# Patient Record
Sex: FEMALE | Race: WHITE | URBAN - METROPOLITAN AREA
[De-identification: names, ages, dates, MRNs, and addresses within clinical notes are randomized per-mention and may not be internally consistent; named-entity substitution may affect disease eponyms.]

---

## 2020-07-17 ENCOUNTER — OFFICE VISIT (OUTPATIENT)
Dept: URBAN - METROPOLITAN AREA CLINIC 98 | Facility: CLINIC | Age: 70
End: 2020-07-17

## 2020-08-06 ENCOUNTER — OFFICE VISIT (OUTPATIENT)
Dept: URBAN - METROPOLITAN AREA CLINIC 98 | Facility: CLINIC | Age: 70
End: 2020-08-06
Payer: MEDICARE

## 2020-08-06 DIAGNOSIS — K59.03 CONSTIPATION DUE TO OPIOID THERAPY: ICD-10-CM

## 2020-08-06 DIAGNOSIS — K75.81 NONALCOHOLIC STEATOHEPATITIS (NASH): ICD-10-CM

## 2020-08-06 DIAGNOSIS — K74.69 OTHER CIRRHOSIS OF LIVER: ICD-10-CM

## 2020-08-06 PROCEDURE — 3017F COLORECTAL CA SCREEN DOC REV: CPT | Performed by: INTERNAL MEDICINE

## 2020-08-06 PROCEDURE — G8417 CALC BMI ABV UP PARAM F/U: HCPCS | Performed by: INTERNAL MEDICINE

## 2020-08-06 PROCEDURE — G9903 PT SCRN TBCO ID AS NON USER: HCPCS | Performed by: INTERNAL MEDICINE

## 2020-08-06 PROCEDURE — 99214 OFFICE O/P EST MOD 30 MIN: CPT | Performed by: INTERNAL MEDICINE

## 2020-08-06 PROCEDURE — G8427 DOCREV CUR MEDS BY ELIG CLIN: HCPCS | Performed by: INTERNAL MEDICINE

## 2020-08-06 RX ORDER — LISDEXAMFETAMINE DIMESYLATE 50 MG/1
TAKE 1 CAPSULE (50 MG) BY ORAL ROUTE ONCE DAILY IN THE MORNING CAPSULE ORAL 1
Qty: 0 | Refills: 0 | Status: ACTIVE | COMMUNITY
Start: 1900-01-01

## 2020-08-06 RX ORDER — LISINOPRIL 5 MG/1
TABLET ORAL
Qty: 0 | Refills: 0 | Status: ACTIVE | COMMUNITY
Start: 1900-01-01

## 2020-08-06 RX ORDER — BUPROPION HYDROCHLORIDE 300 MG/1
TAKE 1 TABLET (300 MG) BY ORAL ROUTE ONCE DAILY TABLET, EXTENDED RELEASE ORAL 1
Qty: 0 | Refills: 0 | Status: ACTIVE | COMMUNITY
Start: 1900-01-01

## 2020-08-06 RX ORDER — ARIPIPRAZOLE 5 MG/1
TABLET ORAL
Qty: 0 | Refills: 0 | Status: ACTIVE | COMMUNITY
Start: 1900-01-01

## 2020-08-06 RX ORDER — DULOXETINE 60 MG/1
TAKE 1 CAPSULE (60 MG) BY ORAL ROUTE ONCE DAILY CAPSULE, DELAYED RELEASE PELLETS ORAL 1
Qty: 0 | Refills: 0 | Status: ACTIVE | COMMUNITY
Start: 1900-01-01

## 2020-08-06 RX ORDER — OXYCODONE 18 MG/1
TAKE 1 CAPSULE (18 MG) BY ORAL ROUTE EVERY 12 HOURS CAPSULE, EXTENDED RELEASE ORAL 2
Qty: 0 | Refills: 0 | Status: ACTIVE | COMMUNITY
Start: 1900-01-01

## 2020-08-06 NOTE — HPI-TODAY'S VISIT:
Has lost weight -did lose 40lbs  did stall now  still has Velveeta biscuits now and then  having problem with gas taking Movantik q3 days - BM q3days

## 2020-08-07 LAB
A/G RATIO: 1.6
AFP, SERUM, TUMOR MARKER: 1.6
ALBUMIN: 4
ALKALINE PHOSPHATASE: 85
ALT (SGPT): 20
AST (SGOT): 19
BASO (ABSOLUTE): 0.1
BASOS: 1
BILIRUBIN, TOTAL: 0.4
BUN/CREATININE RATIO: 21
BUN: 14
CALCIUM: 9.6
CARBON DIOXIDE, TOTAL: 26
CHLORIDE: 99
CREATININE: 0.67
EGFR IF AFRICN AM: 104
EGFR IF NONAFRICN AM: 91
EOS (ABSOLUTE): 0.2
EOS: 3
GLOBULIN, TOTAL: 2.5
GLUCOSE: 97
HEMATOCRIT: 36.2
HEMATOLOGY COMMENTS:: (no result)
HEMOGLOBIN: 12.1
IMMATURE CELLS: (no result)
IMMATURE GRANS (ABS): 0
IMMATURE GRANULOCYTES: 0
INR: 1
LYMPHS (ABSOLUTE): 1.9
LYMPHS: 29
MCH: 31.6
MCHC: 33.4
MCV: 95
MONOCYTES(ABSOLUTE): 0.4
MONOCYTES: 6
NEUTROPHILS (ABSOLUTE): 3.9
NEUTROPHILS: 61
NRBC: (no result)
PLATELETS: 150
POTASSIUM: 4.4
PROTEIN, TOTAL: 6.5
PROTHROMBIN TIME: 10.9
RBC: 3.83
RDW: 12.2
SODIUM: 141
WBC: 6.4

## 2020-08-10 ENCOUNTER — TELEPHONE ENCOUNTER (OUTPATIENT)
Dept: URBAN - METROPOLITAN AREA SURGERY CENTER 18 | Facility: SURGERY CENTER | Age: 70
End: 2020-08-10

## 2020-08-21 ENCOUNTER — LAB OUTSIDE AN ENCOUNTER (OUTPATIENT)
Dept: URBAN - METROPOLITAN AREA CLINIC 98 | Facility: CLINIC | Age: 70
End: 2020-08-21

## 2021-02-02 ENCOUNTER — OFFICE VISIT (OUTPATIENT)
Dept: URBAN - METROPOLITAN AREA CLINIC 98 | Facility: CLINIC | Age: 71
End: 2021-02-02
Payer: MEDICARE

## 2021-02-02 VITALS
HEART RATE: 112 BPM | TEMPERATURE: 97.3 F | HEIGHT: 65 IN | DIASTOLIC BLOOD PRESSURE: 82 MMHG | SYSTOLIC BLOOD PRESSURE: 132 MMHG | BODY MASS INDEX: 27.63 KG/M2 | WEIGHT: 165.8 LBS

## 2021-02-02 DIAGNOSIS — K74.69 OTHER CIRRHOSIS OF LIVER: ICD-10-CM

## 2021-02-02 DIAGNOSIS — K75.81 NONALCOHOLIC STEATOHEPATITIS (NASH): ICD-10-CM

## 2021-02-02 DIAGNOSIS — K59.03 CONSTIPATION DUE TO OPIOID THERAPY: ICD-10-CM

## 2021-02-02 DIAGNOSIS — I85.00 ESOPHAGEAL VARICES: ICD-10-CM

## 2021-02-02 PROCEDURE — G8427 DOCREV CUR MEDS BY ELIG CLIN: HCPCS | Performed by: INTERNAL MEDICINE

## 2021-02-02 PROCEDURE — G9903 PT SCRN TBCO ID AS NON USER: HCPCS | Performed by: INTERNAL MEDICINE

## 2021-02-02 PROCEDURE — 3017F COLORECTAL CA SCREEN DOC REV: CPT | Performed by: INTERNAL MEDICINE

## 2021-02-02 PROCEDURE — 1036F TOBACCO NON-USER: CPT | Performed by: INTERNAL MEDICINE

## 2021-02-02 PROCEDURE — G8420 CALC BMI NORM PARAMETERS: HCPCS | Performed by: INTERNAL MEDICINE

## 2021-02-02 PROCEDURE — G8483 FLU IMM NO ADMIN DOC REA: HCPCS | Performed by: INTERNAL MEDICINE

## 2021-02-02 PROCEDURE — 99214 OFFICE O/P EST MOD 30 MIN: CPT | Performed by: INTERNAL MEDICINE

## 2021-02-02 RX ORDER — ARIPIPRAZOLE 5 MG/1
TABLET ORAL
Qty: 0 | Refills: 0 | COMMUNITY
Start: 1900-01-01

## 2021-02-02 RX ORDER — OXYCODONE 18 MG/1
TAKE 1 CAPSULE (18 MG) BY ORAL ROUTE EVERY 12 HOURS CAPSULE, EXTENDED RELEASE ORAL 2
Qty: 0 | Refills: 0 | COMMUNITY
Start: 1900-01-01

## 2021-02-02 RX ORDER — DULOXETINE 60 MG/1
TAKE 1 CAPSULE (60 MG) BY ORAL ROUTE ONCE DAILY CAPSULE, DELAYED RELEASE PELLETS ORAL 1
Qty: 0 | Refills: 0 | COMMUNITY
Start: 1900-01-01

## 2021-02-02 RX ORDER — LISINOPRIL 5 MG/1
TABLET ORAL
Qty: 0 | Refills: 0 | COMMUNITY
Start: 1900-01-01

## 2021-02-02 RX ORDER — BUPROPION HYDROCHLORIDE 300 MG/1
TAKE 1 TABLET (300 MG) BY ORAL ROUTE ONCE DAILY TABLET, EXTENDED RELEASE ORAL 1
Qty: 0 | Refills: 0 | COMMUNITY
Start: 1900-01-01

## 2021-02-02 RX ORDER — LISDEXAMFETAMINE DIMESYLATE 50 MG/1
TAKE 1 CAPSULE (50 MG) BY ORAL ROUTE ONCE DAILY IN THE MORNING CAPSULE ORAL 1
Qty: 0 | Refills: 0 | COMMUNITY
Start: 1900-01-01

## 2021-02-02 NOTE — HPI-TODAY'S VISIT:
had 2 back surgeries- but now weak and needing walker to move around.  pending MRI of lumbar spine to evaluate w neurosurgeon. doing physical therapy not a lot of pain.   bowels are ok she says.  stlll needing to take Movantik.

## 2021-02-02 NOTE — PHYSICAL EXAM CONSTITUTIONAL:
normal,  alert,  in no acute distress,  well developed, well nourished,  normal communication ability.  walking w walker

## 2021-02-03 LAB
A/G RATIO: 2
AFP, SERUM, TUMOR MARKER: 1.1
ALBUMIN: 4.5
ALKALINE PHOSPHATASE: 67
ALT (SGPT): 18
AST (SGOT): 17
BASO (ABSOLUTE): 0.1
BASOS: 1
BILIRUBIN, TOTAL: 0.6
BUN/CREATININE RATIO: 29
BUN: 20
CALCIUM: 10.5
CARBON DIOXIDE, TOTAL: 22
CHLORIDE: 99
CREATININE: 0.7
EGFR IF AFRICN AM: 103
EGFR IF NONAFRICN AM: 89
EOS (ABSOLUTE): 0.1
EOS: 1
GLOBULIN, TOTAL: 2.3
GLUCOSE: 158
HEMATOCRIT: 42.3
HEMATOLOGY COMMENTS:: (no result)
HEMOGLOBIN: 14.2
IMMATURE CELLS: (no result)
IMMATURE GRANS (ABS): 0.1
IMMATURE GRANULOCYTES: 1
INR: 1.1
LYMPHS (ABSOLUTE): 2.8
LYMPHS: 23
MCH: 31.3
MCHC: 33.6
MCV: 93
MONOCYTES(ABSOLUTE): 0.7
MONOCYTES: 6
NEUTROPHILS (ABSOLUTE): 8.6
NEUTROPHILS: 68
NRBC: (no result)
PLATELETS: 216
POTASSIUM: 4.5
PROTEIN, TOTAL: 6.8
PROTHROMBIN TIME: 11.4
RBC: 4.54
RDW: 13
SODIUM: 137
WBC: 12.4

## 2021-02-11 ENCOUNTER — OFFICE VISIT (OUTPATIENT)
Dept: URBAN - METROPOLITAN AREA CLINIC 98 | Facility: CLINIC | Age: 71
End: 2021-02-11

## 2021-02-19 ENCOUNTER — OFFICE VISIT (OUTPATIENT)
Dept: URBAN - METROPOLITAN AREA CLINIC 22 | Facility: CLINIC | Age: 71
End: 2021-02-19
Payer: MEDICARE

## 2021-02-19 DIAGNOSIS — K74.60 CIRRHOSIS: ICD-10-CM

## 2021-02-19 PROCEDURE — 76700 US EXAM ABDOM COMPLETE: CPT | Performed by: INTERNAL MEDICINE

## 2021-08-05 ENCOUNTER — WEB ENCOUNTER (OUTPATIENT)
Dept: URBAN - METROPOLITAN AREA CLINIC 98 | Facility: CLINIC | Age: 71
End: 2021-08-05

## 2021-08-05 ENCOUNTER — LAB OUTSIDE AN ENCOUNTER (OUTPATIENT)
Dept: URBAN - METROPOLITAN AREA CLINIC 98 | Facility: CLINIC | Age: 71
End: 2021-08-05

## 2021-08-05 ENCOUNTER — OFFICE VISIT (OUTPATIENT)
Dept: URBAN - METROPOLITAN AREA CLINIC 98 | Facility: CLINIC | Age: 71
End: 2021-08-05
Payer: MEDICARE

## 2021-08-05 VITALS
HEIGHT: 65 IN | WEIGHT: 162.2 LBS | DIASTOLIC BLOOD PRESSURE: 75 MMHG | BODY MASS INDEX: 27.02 KG/M2 | HEART RATE: 105 BPM | SYSTOLIC BLOOD PRESSURE: 130 MMHG | TEMPERATURE: 96.9 F

## 2021-08-05 DIAGNOSIS — I85.00 ESOPHAGEAL VARICES: ICD-10-CM

## 2021-08-05 DIAGNOSIS — K74.60 CIRRHOSIS: ICD-10-CM

## 2021-08-05 DIAGNOSIS — K59.03 CONSTIPATION DUE TO OPIOID THERAPY: ICD-10-CM

## 2021-08-05 DIAGNOSIS — Z87.42 HISTORY OF DYSFUNCTIONAL UTERINE BLEEDING: ICD-10-CM

## 2021-08-05 DIAGNOSIS — K75.81 NONALCOHOLIC STEATOHEPATITIS (NASH): ICD-10-CM

## 2021-08-05 PROCEDURE — 99214 OFFICE O/P EST MOD 30 MIN: CPT | Performed by: INTERNAL MEDICINE

## 2021-08-05 RX ORDER — OXYCODONE 18 MG/1
TAKE 1 CAPSULE (18 MG) BY ORAL ROUTE EVERY 12 HOURS CAPSULE, EXTENDED RELEASE ORAL 2
Qty: 0 | Refills: 0 | Status: ACTIVE | COMMUNITY
Start: 1900-01-01

## 2021-08-05 RX ORDER — BUPROPION HYDROCHLORIDE 300 MG/1
TAKE 1 TABLET (300 MG) BY ORAL ROUTE ONCE DAILY TABLET, EXTENDED RELEASE ORAL 1
Qty: 0 | Refills: 0 | Status: ACTIVE | COMMUNITY
Start: 1900-01-01

## 2021-08-05 RX ORDER — DULOXETINE 60 MG/1
TAKE 1 CAPSULE (60 MG) BY ORAL ROUTE ONCE DAILY CAPSULE, DELAYED RELEASE PELLETS ORAL 1
Qty: 0 | Refills: 0 | Status: ACTIVE | COMMUNITY
Start: 1900-01-01

## 2021-08-05 RX ORDER — LISINOPRIL 5 MG/1
TABLET ORAL
Qty: 0 | Refills: 0 | Status: ACTIVE | COMMUNITY
Start: 1900-01-01

## 2021-08-05 RX ORDER — ARIPIPRAZOLE 5 MG/1
TABLET ORAL
Qty: 0 | Refills: 0 | Status: ACTIVE | COMMUNITY
Start: 1900-01-01

## 2021-08-05 RX ORDER — LISDEXAMFETAMINE DIMESYLATE 50 MG/1
TAKE 1 CAPSULE (50 MG) BY ORAL ROUTE ONCE DAILY IN THE MORNING CAPSULE ORAL 1
Qty: 0 | Refills: 0 | Status: ACTIVE | COMMUNITY
Start: 1900-01-01

## 2021-08-05 NOTE — HPI-TODAY'S VISIT:
saw back surgeon - still needing walker to ambulate but ambulates well with it.  going to start physical therapy again soon.   seeing pain center. working on diet to keep weight down  no more constipation on movantik and reggie probiotic  working to get land she recently inherited ready to sell

## 2021-08-06 LAB
A/G RATIO: 1.9
AFP, SERUM, TUMOR MARKER: 1.2
ALBUMIN: 4.2
ALKALINE PHOSPHATASE: 72
ALT (SGPT): 20
AST (SGOT): 17
BASO (ABSOLUTE): 0.1
BASOS: 1
BILIRUBIN, TOTAL: 0.2
BUN/CREATININE RATIO: 19
BUN: 11
CALCIUM: 10.1
CARBON DIOXIDE, TOTAL: 27
CHLORIDE: 100
CREATININE: 0.59
EGFR IF AFRICN AM: 108
EGFR IF NONAFRICN AM: 94
EOS (ABSOLUTE): 0.3
EOS: 4
GLOBULIN, TOTAL: 2.2
GLUCOSE: 153
HEMATOCRIT: 36.7
HEMATOLOGY COMMENTS:: (no result)
HEMOGLOBIN: 12.2
IMMATURE CELLS: (no result)
IMMATURE GRANS (ABS): 0
IMMATURE GRANULOCYTES: 0
INR: 1
LYMPHS (ABSOLUTE): 1.8
LYMPHS: 26
MCH: 31.1
MCHC: 33.2
MCV: 94
MONOCYTES(ABSOLUTE): 0.3
MONOCYTES: 5
NEUTROPHILS (ABSOLUTE): 4.4
NEUTROPHILS: 64
NRBC: (no result)
PLATELETS: 170
POTASSIUM: 5
PROTEIN, TOTAL: 6.4
PROTHROMBIN TIME: 10.8
RBC: 3.92
RDW: 12.6
SODIUM: 140
WBC: 6.8

## 2021-08-20 ENCOUNTER — OFFICE VISIT (OUTPATIENT)
Dept: URBAN - METROPOLITAN AREA CLINIC 22 | Facility: CLINIC | Age: 71
End: 2021-08-20
Payer: MEDICARE

## 2021-08-20 DIAGNOSIS — R16.2 HEPATOSPLENOMEGALY: ICD-10-CM

## 2021-08-20 DIAGNOSIS — K74.69 OTHER CIRRHOSIS OF LIVER: ICD-10-CM

## 2021-08-20 PROCEDURE — 76700 US EXAM ABDOM COMPLETE: CPT | Performed by: INTERNAL MEDICINE

## 2021-08-26 ENCOUNTER — TELEPHONE ENCOUNTER (OUTPATIENT)
Dept: URBAN - METROPOLITAN AREA CLINIC 98 | Facility: CLINIC | Age: 71
End: 2021-08-26

## 2021-09-09 ENCOUNTER — OFFICE VISIT (OUTPATIENT)
Dept: URBAN - METROPOLITAN AREA LAB 3 | Facility: LAB | Age: 71
End: 2021-09-09
Payer: MEDICARE

## 2021-09-09 ENCOUNTER — LAB OUTSIDE AN ENCOUNTER (OUTPATIENT)
Dept: URBAN - METROPOLITAN AREA CLINIC 98 | Facility: CLINIC | Age: 71
End: 2021-09-09

## 2021-09-09 DIAGNOSIS — K29.60 ADENOPAPILLOMATOSIS GASTRICA: ICD-10-CM

## 2021-09-09 LAB
GLUCOSE POC: 135
PERFORMING LAB: (no result)

## 2021-09-09 PROCEDURE — 43239 EGD BIOPSY SINGLE/MULTIPLE: CPT | Performed by: INTERNAL MEDICINE

## 2021-09-09 RX ORDER — DULOXETINE 60 MG/1
TAKE 1 CAPSULE (60 MG) BY ORAL ROUTE ONCE DAILY CAPSULE, DELAYED RELEASE PELLETS ORAL 1
Qty: 0 | Refills: 0 | Status: ACTIVE | COMMUNITY
Start: 1900-01-01

## 2021-09-09 RX ORDER — BUPROPION HYDROCHLORIDE 300 MG/1
TAKE 1 TABLET (300 MG) BY ORAL ROUTE ONCE DAILY TABLET, EXTENDED RELEASE ORAL 1
Qty: 0 | Refills: 0 | Status: ACTIVE | COMMUNITY
Start: 1900-01-01

## 2021-09-09 RX ORDER — LISDEXAMFETAMINE DIMESYLATE 50 MG/1
TAKE 1 CAPSULE (50 MG) BY ORAL ROUTE ONCE DAILY IN THE MORNING CAPSULE ORAL 1
Qty: 0 | Refills: 0 | Status: ACTIVE | COMMUNITY
Start: 1900-01-01

## 2021-09-09 RX ORDER — ARIPIPRAZOLE 5 MG/1
TABLET ORAL
Qty: 0 | Refills: 0 | Status: ACTIVE | COMMUNITY
Start: 1900-01-01

## 2021-09-09 RX ORDER — LISINOPRIL 5 MG/1
TABLET ORAL
Qty: 0 | Refills: 0 | Status: ACTIVE | COMMUNITY
Start: 1900-01-01

## 2021-09-09 RX ORDER — OXYCODONE 18 MG/1
TAKE 1 CAPSULE (18 MG) BY ORAL ROUTE EVERY 12 HOURS CAPSULE, EXTENDED RELEASE ORAL 2
Qty: 0 | Refills: 0 | Status: ACTIVE | COMMUNITY
Start: 1900-01-01

## 2022-03-10 ENCOUNTER — OFFICE VISIT (OUTPATIENT)
Dept: URBAN - METROPOLITAN AREA CLINIC 98 | Facility: CLINIC | Age: 72
End: 2022-03-10
Payer: MEDICARE

## 2022-03-10 VITALS
TEMPERATURE: 97.6 F | HEART RATE: 96 BPM | HEIGHT: 65 IN | SYSTOLIC BLOOD PRESSURE: 112 MMHG | BODY MASS INDEX: 28.92 KG/M2 | WEIGHT: 173.6 LBS | DIASTOLIC BLOOD PRESSURE: 68 MMHG

## 2022-03-10 DIAGNOSIS — Z87.42 HISTORY OF DYSFUNCTIONAL UTERINE BLEEDING: ICD-10-CM

## 2022-03-10 DIAGNOSIS — R41.0 CONFUSION: ICD-10-CM

## 2022-03-10 DIAGNOSIS — K75.81 NONALCOHOLIC STEATOHEPATITIS (NASH): ICD-10-CM

## 2022-03-10 DIAGNOSIS — I85.00 ESOPHAGEAL VARICES: ICD-10-CM

## 2022-03-10 DIAGNOSIS — K74.60 CIRRHOSIS: ICD-10-CM

## 2022-03-10 DIAGNOSIS — K59.03 CONSTIPATION DUE TO OPIOID THERAPY: ICD-10-CM

## 2022-03-10 PROCEDURE — 99214 OFFICE O/P EST MOD 30 MIN: CPT | Performed by: INTERNAL MEDICINE

## 2022-03-10 RX ORDER — ARIPIPRAZOLE 5 MG/1
TABLET ORAL
Qty: 0 | Refills: 0 | Status: ACTIVE | COMMUNITY
Start: 1900-01-01

## 2022-03-10 RX ORDER — BUPROPION HYDROCHLORIDE 300 MG/1
TAKE 1 TABLET (300 MG) BY ORAL ROUTE ONCE DAILY TABLET, EXTENDED RELEASE ORAL 1
Qty: 0 | Refills: 0 | Status: ACTIVE | COMMUNITY
Start: 1900-01-01

## 2022-03-10 RX ORDER — OXYCODONE 18 MG/1
TAKE 1 CAPSULE (18 MG) BY ORAL ROUTE EVERY 12 HOURS CAPSULE, EXTENDED RELEASE ORAL 2
Qty: 0 | Refills: 0 | Status: ACTIVE | COMMUNITY
Start: 1900-01-01

## 2022-03-10 RX ORDER — LISDEXAMFETAMINE DIMESYLATE 50 MG/1
TAKE 1 CAPSULE (50 MG) BY ORAL ROUTE ONCE DAILY IN THE MORNING CAPSULE ORAL 1
Qty: 0 | Refills: 0 | Status: ACTIVE | COMMUNITY
Start: 1900-01-01

## 2022-03-10 RX ORDER — DULOXETINE 60 MG/1
TAKE 1 CAPSULE (60 MG) BY ORAL ROUTE ONCE DAILY CAPSULE, DELAYED RELEASE PELLETS ORAL 1
Qty: 0 | Refills: 0 | Status: ACTIVE | COMMUNITY
Start: 1900-01-01

## 2022-03-10 RX ORDER — LACTULOSE 10 G/15ML
15 ML SOLUTION ORAL
Qty: 1000 MILLILITER | Refills: 1 | OUTPATIENT
Start: 2022-03-10 | End: 2022-05-09

## 2022-03-10 RX ORDER — LISINOPRIL 5 MG/1
TABLET ORAL
Qty: 0 | Refills: 0 | Status: ACTIVE | COMMUNITY
Start: 1900-01-01

## 2022-03-10 NOTE — HPI-TODAY'S VISIT:
still having to use walker.   having back issues and pain.  taking Movantik for the narcotic constipation  knows that she has gained weight  laying off sweets  eating less feeling brain fog

## 2022-03-11 LAB
A/G RATIO: 2.3
AFP, SERUM, TUMOR MARKER: 1.6
ALBUMIN: 4.5
ALKALINE PHOSPHATASE: 67
ALT (SGPT): 12
AMMONIA, PLASMA: 47
AST (SGOT): 16
BASO (ABSOLUTE): 0.1
BASOS: 1
BILIRUBIN, TOTAL: 0.2
BUN/CREATININE RATIO: 14
BUN: 11
CALCIUM: 9.1
CARBON DIOXIDE, TOTAL: 24
CHLORIDE: 102
CREATININE: 0.81
EGFR: 79
EOS (ABSOLUTE): 0.2
EOS: 3
GLOBULIN, TOTAL: 2
GLUCOSE: 136
HEMATOCRIT: 38
HEMATOLOGY COMMENTS:: (no result)
HEMOGLOBIN: 12.8
IMMATURE CELLS: (no result)
IMMATURE GRANS (ABS): 0
IMMATURE GRANULOCYTES: 0
INR: 1
LYMPHS (ABSOLUTE): 1.9
LYMPHS: 26
MCH: 30.7
MCHC: 33.7
MCV: 91
MONOCYTES(ABSOLUTE): 0.4
MONOCYTES: 5
NEUTROPHILS (ABSOLUTE): 4.7
NEUTROPHILS: 65
NRBC: (no result)
PLATELETS: 176
POTASSIUM: 4.3
PROTEIN, TOTAL: 6.5
PROTHROMBIN TIME: 11
RBC: 4.17
RDW: 12.6
SODIUM: 140
WBC: 7.3

## 2022-03-30 ENCOUNTER — TELEPHONE ENCOUNTER (OUTPATIENT)
Dept: URBAN - METROPOLITAN AREA CLINIC 98 | Facility: CLINIC | Age: 72
End: 2022-03-30

## 2022-04-01 ENCOUNTER — OFFICE VISIT (OUTPATIENT)
Dept: URBAN - METROPOLITAN AREA CLINIC 22 | Facility: CLINIC | Age: 72
End: 2022-04-01
Payer: MEDICARE

## 2022-04-01 DIAGNOSIS — K74.69 CIRRHOSIS, CRYPTOGENIC: ICD-10-CM

## 2022-04-01 PROCEDURE — 76700 US EXAM ABDOM COMPLETE: CPT | Performed by: INTERNAL MEDICINE

## 2022-04-04 ENCOUNTER — ERX REFILL RESPONSE (OUTPATIENT)
Dept: URBAN - METROPOLITAN AREA CLINIC 98 | Facility: CLINIC | Age: 72
End: 2022-04-04

## 2022-04-04 RX ORDER — LACTULOSE 10 G/15ML
TAKE 15 ML BY MOUTH 1-2 TIMES DAILY 30 DAY(S) SOLUTION ORAL
Qty: 1000 MILLILITER | Refills: 4 | OUTPATIENT

## 2022-04-04 RX ORDER — LACTULOSE 10 G/15ML
15 ML SOLUTION ORAL
Qty: 1000 MILLILITER | Refills: 1 | OUTPATIENT

## 2022-08-04 ENCOUNTER — TELEPHONE ENCOUNTER (OUTPATIENT)
Dept: URBAN - METROPOLITAN AREA CLINIC 98 | Facility: CLINIC | Age: 72
End: 2022-08-04

## 2022-08-05 ENCOUNTER — OFFICE VISIT (OUTPATIENT)
Dept: URBAN - METROPOLITAN AREA CLINIC 98 | Facility: CLINIC | Age: 72
End: 2022-08-05

## 2022-08-08 ENCOUNTER — OFFICE VISIT (OUTPATIENT)
Dept: URBAN - METROPOLITAN AREA CLINIC 98 | Facility: CLINIC | Age: 72
End: 2022-08-08

## 2022-08-09 ENCOUNTER — TELEPHONE ENCOUNTER (OUTPATIENT)
Dept: URBAN - METROPOLITAN AREA CLINIC 98 | Facility: CLINIC | Age: 72
End: 2022-08-09

## 2022-09-06 ENCOUNTER — LAB OUTSIDE AN ENCOUNTER (OUTPATIENT)
Dept: URBAN - METROPOLITAN AREA CLINIC 98 | Facility: CLINIC | Age: 72
End: 2022-09-06

## 2022-09-06 ENCOUNTER — WEB ENCOUNTER (OUTPATIENT)
Dept: URBAN - METROPOLITAN AREA CLINIC 98 | Facility: CLINIC | Age: 72
End: 2022-09-06

## 2022-09-06 ENCOUNTER — OFFICE VISIT (OUTPATIENT)
Dept: URBAN - METROPOLITAN AREA CLINIC 98 | Facility: CLINIC | Age: 72
End: 2022-09-06
Payer: MEDICARE

## 2022-09-06 VITALS
SYSTOLIC BLOOD PRESSURE: 113 MMHG | TEMPERATURE: 106 F | BODY MASS INDEX: 31.86 KG/M2 | HEART RATE: 106 BPM | HEIGHT: 65 IN | DIASTOLIC BLOOD PRESSURE: 63 MMHG | WEIGHT: 191.2 LBS

## 2022-09-06 DIAGNOSIS — R14.0 ABDOMINAL DISTENSION: ICD-10-CM

## 2022-09-06 DIAGNOSIS — I85.00 ESOPHAGEAL VARICES: ICD-10-CM

## 2022-09-06 DIAGNOSIS — K59.03 CONSTIPATION DUE TO OPIOID THERAPY: ICD-10-CM

## 2022-09-06 DIAGNOSIS — K75.81 NONALCOHOLIC STEATOHEPATITIS (NASH): ICD-10-CM

## 2022-09-06 DIAGNOSIS — K74.60 CIRRHOSIS: ICD-10-CM

## 2022-09-06 DIAGNOSIS — Z87.42 HISTORY OF DYSFUNCTIONAL UTERINE BLEEDING: ICD-10-CM

## 2022-09-06 PROBLEM — 28670008 ESOPHAGEAL VARICES: Status: ACTIVE | Noted: 2021-02-02

## 2022-09-06 PROCEDURE — 99214 OFFICE O/P EST MOD 30 MIN: CPT | Performed by: INTERNAL MEDICINE

## 2022-09-06 RX ORDER — LACTULOSE 10 G/15ML
TAKE 15 ML BY MOUTH 1-2 TIMES DAILY 30 DAY(S) SOLUTION ORAL
Qty: 1000 MILLILITER | Refills: 4 | Status: ACTIVE | COMMUNITY

## 2022-09-06 RX ORDER — DULOXETINE 60 MG/1
TAKE 1 CAPSULE (60 MG) BY ORAL ROUTE ONCE DAILY CAPSULE, DELAYED RELEASE PELLETS ORAL 1
Qty: 0 | Refills: 0 | Status: ACTIVE | COMMUNITY
Start: 1900-01-01

## 2022-09-06 RX ORDER — LISINOPRIL 5 MG/1
TABLET ORAL
Qty: 0 | Refills: 0 | Status: ACTIVE | COMMUNITY
Start: 1900-01-01

## 2022-09-06 RX ORDER — OXYCODONE 18 MG/1
TAKE 1 CAPSULE (18 MG) BY ORAL ROUTE EVERY 12 HOURS CAPSULE, EXTENDED RELEASE ORAL 2
Qty: 0 | Refills: 0 | Status: ACTIVE | COMMUNITY
Start: 1900-01-01

## 2022-09-06 RX ORDER — LISDEXAMFETAMINE DIMESYLATE 50 MG/1
TAKE 1 CAPSULE (50 MG) BY ORAL ROUTE ONCE DAILY IN THE MORNING CAPSULE ORAL 1
Qty: 0 | Refills: 0 | Status: ACTIVE | COMMUNITY
Start: 1900-01-01

## 2022-09-06 RX ORDER — ARIPIPRAZOLE 5 MG/1
TABLET ORAL
Qty: 0 | Refills: 0 | Status: ACTIVE | COMMUNITY
Start: 1900-01-01

## 2022-09-06 RX ORDER — BUPROPION HYDROCHLORIDE 300 MG/1
TAKE 1 TABLET (300 MG) BY ORAL ROUTE ONCE DAILY TABLET, EXTENDED RELEASE ORAL 1
Qty: 0 | Refills: 0 | Status: ACTIVE | COMMUNITY
Start: 1900-01-01

## 2022-09-06 NOTE — HPI-TODAY'S VISIT:
she was confused and missed a few appointemnts- she couldn't come in but for a month - she had diarrhea all the time.  was leaking.  but now back to normal.   also gaining weight - 20 lbs since 6 months  no pain has been eating a lot more food

## 2022-10-06 ENCOUNTER — LAB OUTSIDE AN ENCOUNTER (OUTPATIENT)
Dept: URBAN - METROPOLITAN AREA CLINIC 98 | Facility: CLINIC | Age: 72
End: 2022-10-06

## 2022-10-06 ENCOUNTER — TELEPHONE ENCOUNTER (OUTPATIENT)
Dept: URBAN - METROPOLITAN AREA CLINIC 98 | Facility: CLINIC | Age: 72
End: 2022-10-06

## 2023-03-21 ENCOUNTER — LAB OUTSIDE AN ENCOUNTER (OUTPATIENT)
Dept: URBAN - METROPOLITAN AREA CLINIC 98 | Facility: CLINIC | Age: 73
End: 2023-03-21

## 2023-03-21 ENCOUNTER — OFFICE VISIT (OUTPATIENT)
Dept: URBAN - METROPOLITAN AREA CLINIC 98 | Facility: CLINIC | Age: 73
End: 2023-03-21
Payer: MEDICARE

## 2023-03-21 VITALS
BODY MASS INDEX: 35.26 KG/M2 | DIASTOLIC BLOOD PRESSURE: 67 MMHG | WEIGHT: 199 LBS | TEMPERATURE: 97 F | SYSTOLIC BLOOD PRESSURE: 127 MMHG | HEART RATE: 100 BPM | HEIGHT: 63 IN

## 2023-03-21 DIAGNOSIS — Z87.42 HISTORY OF DYSFUNCTIONAL UTERINE BLEEDING: ICD-10-CM

## 2023-03-21 DIAGNOSIS — R14.0 ABDOMINAL DISTENSION: ICD-10-CM

## 2023-03-21 DIAGNOSIS — K59.03 CONSTIPATION DUE TO OPIOID THERAPY: ICD-10-CM

## 2023-03-21 DIAGNOSIS — I85.00 ESOPHAGEAL VARICES: ICD-10-CM

## 2023-03-21 DIAGNOSIS — K74.60 CIRRHOSIS: ICD-10-CM

## 2023-03-21 DIAGNOSIS — K75.81 NONALCOHOLIC STEATOHEPATITIS (NASH): ICD-10-CM

## 2023-03-21 PROBLEM — 442685003 NASH - NONALCOHOLIC STEATOHEPATITIS: Status: ACTIVE | Noted: 2020-08-06

## 2023-03-21 PROCEDURE — 99213 OFFICE O/P EST LOW 20 MIN: CPT | Performed by: INTERNAL MEDICINE

## 2023-03-21 RX ORDER — DULOXETINE 60 MG/1
TAKE 1 CAPSULE (60 MG) BY ORAL ROUTE ONCE DAILY CAPSULE, DELAYED RELEASE PELLETS ORAL 1
Qty: 0 | Refills: 0 | Status: ACTIVE | COMMUNITY
Start: 1900-01-01

## 2023-03-21 RX ORDER — LISINOPRIL 5 MG/1
TABLET ORAL
Qty: 0 | Refills: 0 | Status: ACTIVE | COMMUNITY
Start: 1900-01-01

## 2023-03-21 RX ORDER — LISDEXAMFETAMINE DIMESYLATE 50 MG/1
TAKE 1 CAPSULE (50 MG) BY ORAL ROUTE ONCE DAILY IN THE MORNING CAPSULE ORAL 1
Qty: 0 | Refills: 0 | Status: ON HOLD | COMMUNITY
Start: 1900-01-01

## 2023-03-21 RX ORDER — BUPROPION HYDROCHLORIDE 300 MG/1
TAKE 1 TABLET (300 MG) BY ORAL ROUTE ONCE DAILY TABLET, EXTENDED RELEASE ORAL 1
Qty: 0 | Refills: 0 | Status: ACTIVE | COMMUNITY
Start: 1900-01-01

## 2023-03-21 RX ORDER — ARIPIPRAZOLE 5 MG/1
TABLET ORAL
Qty: 0 | Refills: 0 | Status: ACTIVE | COMMUNITY
Start: 1900-01-01

## 2023-03-21 RX ORDER — OXYCODONE 18 MG/1
TAKE 1 CAPSULE (18 MG) BY ORAL ROUTE EVERY 12 HOURS CAPSULE, EXTENDED RELEASE ORAL 2
Qty: 0 | Refills: 0 | Status: ACTIVE | COMMUNITY
Start: 1900-01-01

## 2023-03-21 RX ORDER — LACTULOSE 10 G/15ML
TAKE 15 ML BY MOUTH 1-2 TIMES DAILY 30 DAY(S) SOLUTION ORAL
Qty: 1000 MILLILITER | Refills: 4 | Status: ON HOLD | COMMUNITY

## 2023-03-21 NOTE — HPI-TODAY'S VISIT:
She has gained "a ton of weight"  blames Ozempic rebound and beyond starting low sugar low carb diet this week  has now resumed Ozempic

## 2023-03-22 ENCOUNTER — TELEPHONE ENCOUNTER (OUTPATIENT)
Dept: URBAN - METROPOLITAN AREA CLINIC 35 | Facility: CLINIC | Age: 73
End: 2023-03-22

## 2023-03-22 LAB
A/G RATIO: 1.7
AFP, SERUM, TUMOR MARKER: <1.8
ALBUMIN: 3.8
ALKALINE PHOSPHATASE: 93
ALT (SGPT): 13
AST (SGOT): 15
BASO (ABSOLUTE): 0.1
BASOS: 1
BILIRUBIN, DIRECT: 0.18
BILIRUBIN, TOTAL: 0.5
BUN/CREATININE RATIO: 16
BUN: 12
CALCIUM: 9.6
CARBON DIOXIDE, TOTAL: 25
CHLORIDE: 103
CREATININE: 0.77
EGFR: 83
EOS (ABSOLUTE): 0.3
EOS: 4
GLOBULIN, TOTAL: 2.2
GLUCOSE: 111
HEMATOCRIT: 38.7
HEMOGLOBIN: 13
IMMATURE GRANS (ABS): 0
IMMATURE GRANULOCYTES: 0
INR: 1.1
LYMPHS (ABSOLUTE): 1.9
LYMPHS: 28
MCH: 31.9
MCHC: 33.6
MCV: 95
MONOCYTES(ABSOLUTE): 0.4
MONOCYTES: 6
NEUTROPHILS (ABSOLUTE): 4.1
NEUTROPHILS: 61
PLATELETS: 146
POTASSIUM: 4.2
PROTEIN, TOTAL: 6
PROTHROMBIN TIME: 11
RBC: 4.08
RDW: 12.1
SODIUM: 140
WBC: 6.8

## 2023-03-31 ENCOUNTER — OFFICE VISIT (OUTPATIENT)
Dept: URBAN - METROPOLITAN AREA CLINIC 22 | Facility: CLINIC | Age: 73
End: 2023-03-31
Payer: MEDICARE

## 2023-03-31 DIAGNOSIS — K74.69 OTHER CIRRHOSIS OF LIVER: ICD-10-CM

## 2023-03-31 PROCEDURE — 76700 US EXAM ABDOM COMPLETE: CPT | Performed by: INTERNAL MEDICINE

## 2023-04-09 ENCOUNTER — TELEPHONE ENCOUNTER (OUTPATIENT)
Dept: URBAN - METROPOLITAN AREA CLINIC 35 | Facility: CLINIC | Age: 73
End: 2023-04-09

## 2023-08-15 NOTE — PHYSICAL EXAM CARDIOVASCULAR:
no edema, regular rate - D/t ESRD vs. possible gastric ulcer  - 8/8 1U PRBC's ordered but not given  - C/w Procrit   - C/w Protonix & Carafate   - Maintain type and screen Q3D  - GI-Dr. Lee consulted-No planned intervention

## 2023-09-19 ENCOUNTER — LAB OUTSIDE AN ENCOUNTER (OUTPATIENT)
Dept: URBAN - METROPOLITAN AREA CLINIC 98 | Facility: CLINIC | Age: 73
End: 2023-09-19

## 2023-09-19 ENCOUNTER — WEB ENCOUNTER (OUTPATIENT)
Dept: URBAN - METROPOLITAN AREA CLINIC 98 | Facility: CLINIC | Age: 73
End: 2023-09-19

## 2023-09-19 ENCOUNTER — OFFICE VISIT (OUTPATIENT)
Dept: URBAN - METROPOLITAN AREA CLINIC 98 | Facility: CLINIC | Age: 73
End: 2023-09-19
Payer: MEDICARE

## 2023-09-19 VITALS
BODY MASS INDEX: 35.79 KG/M2 | DIASTOLIC BLOOD PRESSURE: 88 MMHG | TEMPERATURE: 97.3 F | SYSTOLIC BLOOD PRESSURE: 142 MMHG | WEIGHT: 202 LBS | HEIGHT: 63 IN | HEART RATE: 94 BPM

## 2023-09-19 DIAGNOSIS — K74.60 CIRRHOSIS: ICD-10-CM

## 2023-09-19 DIAGNOSIS — D50.0 IRON DEFICIENCY ANEMIA DUE TO CHRONIC BLOOD LOSS: ICD-10-CM

## 2023-09-19 DIAGNOSIS — K59.03 CONSTIPATION DUE TO OPIOID THERAPY: ICD-10-CM

## 2023-09-19 DIAGNOSIS — I85.00 ESOPHAGEAL VARICES: ICD-10-CM

## 2023-09-19 DIAGNOSIS — Z87.42 HISTORY OF DYSFUNCTIONAL UTERINE BLEEDING: ICD-10-CM

## 2023-09-19 DIAGNOSIS — R14.0 ABDOMINAL DISTENSION: ICD-10-CM

## 2023-09-19 DIAGNOSIS — K75.81 NONALCOHOLIC STEATOHEPATITIS (NASH): ICD-10-CM

## 2023-09-19 PROBLEM — 724556004: Status: ACTIVE | Noted: 2023-09-19

## 2023-09-19 PROCEDURE — 99214 OFFICE O/P EST MOD 30 MIN: CPT | Performed by: INTERNAL MEDICINE

## 2023-09-19 RX ORDER — LACTULOSE 10 G/15ML
TAKE 15 ML BY MOUTH 1-2 TIMES DAILY 30 DAY(S) SOLUTION ORAL
Qty: 1000 MILLILITER | Refills: 4 | Status: ON HOLD | COMMUNITY

## 2023-09-19 RX ORDER — LISINOPRIL 5 MG/1
TABLET ORAL
Qty: 0 | Refills: 0 | Status: ACTIVE | COMMUNITY
Start: 1900-01-01

## 2023-09-19 RX ORDER — LISDEXAMFETAMINE DIMESYLATE 50 MG/1
TAKE 1 CAPSULE (50 MG) BY ORAL ROUTE ONCE DAILY IN THE MORNING CAPSULE ORAL 1
Qty: 0 | Refills: 0 | Status: ON HOLD | COMMUNITY
Start: 1900-01-01

## 2023-09-19 RX ORDER — BUPROPION HYDROCHLORIDE 300 MG/1
TAKE 1 TABLET (300 MG) BY ORAL ROUTE ONCE DAILY TABLET, EXTENDED RELEASE ORAL 1
Qty: 0 | Refills: 0 | Status: ACTIVE | COMMUNITY
Start: 1900-01-01

## 2023-09-20 ENCOUNTER — TELEPHONE ENCOUNTER (OUTPATIENT)
Dept: URBAN - METROPOLITAN AREA CLINIC 98 | Facility: CLINIC | Age: 73
End: 2023-09-20

## 2023-09-20 LAB
A/G RATIO: 1.5
AFP, SERUM, TUMOR MARKER: 2.3
ALBUMIN: 4.2
ALKALINE PHOSPHATASE: 87
ALT (SGPT): 17
AST (SGOT): 22
BASO (ABSOLUTE): 0.1
BASOS: 1
BILIRUBIN, TOTAL: 0.5
BUN/CREATININE RATIO: 14
BUN: 11
CALCIUM: 9.8
CARBON DIOXIDE, TOTAL: 27
CHLORIDE: 99
CREATININE: 0.8
EGFR: 79
EOS (ABSOLUTE): 0.1
EOS: 2
GLOBULIN, TOTAL: 2.8
GLUCOSE: 80
HEMATOCRIT: 41.3
HEMATOLOGY COMMENTS:: (no result)
HEMOGLOBIN: 13.9
IMMATURE CELLS: (no result)
IMMATURE GRANS (ABS): 0
IMMATURE GRANULOCYTES: 0
INR: 1.1
LYMPHS (ABSOLUTE): 1.6
LYMPHS: 21
MCH: 32.6
MCHC: 33.7
MCV: 97
MONOCYTES(ABSOLUTE): 0.4
MONOCYTES: 6
NEUTROPHILS (ABSOLUTE): 5.3
NEUTROPHILS: 70
NRBC: (no result)
PLATELETS: 156
POTASSIUM: 4.9
PROTEIN, TOTAL: 7
PROTHROMBIN TIME: 11.9
RBC: 4.27
RDW: 13.1
SODIUM: 140
WBC: 7.5

## 2023-09-21 ENCOUNTER — LAB OUTSIDE AN ENCOUNTER (OUTPATIENT)
Dept: URBAN - METROPOLITAN AREA CLINIC 98 | Facility: CLINIC | Age: 73
End: 2023-09-21

## 2023-09-21 PROBLEM — 255417007 CIRRHOTIC: Status: ACTIVE | Noted: 2021-08-04

## 2023-10-13 ENCOUNTER — OFFICE VISIT (OUTPATIENT)
Dept: URBAN - METROPOLITAN AREA CLINIC 22 | Facility: CLINIC | Age: 73
End: 2023-10-13
Payer: MEDICARE

## 2023-10-13 DIAGNOSIS — K74.69 OTHER CIRRHOSIS OF LIVER: ICD-10-CM

## 2023-10-13 PROCEDURE — 76700 US EXAM ABDOM COMPLETE: CPT | Performed by: INTERNAL MEDICINE

## 2023-10-14 ENCOUNTER — TELEPHONE ENCOUNTER (OUTPATIENT)
Dept: URBAN - METROPOLITAN AREA CLINIC 98 | Facility: CLINIC | Age: 73
End: 2023-10-14

## 2024-03-21 ENCOUNTER — LAB (OUTPATIENT)
Dept: URBAN - METROPOLITAN AREA CLINIC 98 | Facility: CLINIC | Age: 74
End: 2024-03-21

## 2024-03-21 ENCOUNTER — OV EP (OUTPATIENT)
Dept: URBAN - METROPOLITAN AREA CLINIC 98 | Facility: CLINIC | Age: 74
End: 2024-03-21
Payer: MEDICARE

## 2024-03-21 VITALS
BODY MASS INDEX: 32.46 KG/M2 | TEMPERATURE: 97.4 F | WEIGHT: 183.2 LBS | SYSTOLIC BLOOD PRESSURE: 108 MMHG | HEIGHT: 63 IN | DIASTOLIC BLOOD PRESSURE: 60 MMHG | HEART RATE: 101 BPM

## 2024-03-21 DIAGNOSIS — K74.60 CIRRHOSIS: ICD-10-CM

## 2024-03-21 DIAGNOSIS — K75.81 NONALCOHOLIC STEATOHEPATITIS (NASH): ICD-10-CM

## 2024-03-21 DIAGNOSIS — I85.00 ESOPHAGEAL VARICES: ICD-10-CM

## 2024-03-21 DIAGNOSIS — Z87.42 HISTORY OF DYSFUNCTIONAL UTERINE BLEEDING: ICD-10-CM

## 2024-03-21 DIAGNOSIS — K59.03 CONSTIPATION DUE TO OPIOID THERAPY: ICD-10-CM

## 2024-03-21 DIAGNOSIS — D50.0 IRON DEFICIENCY ANEMIA DUE TO CHRONIC BLOOD LOSS: ICD-10-CM

## 2024-03-21 DIAGNOSIS — R14.0 ABDOMINAL DISTENSION: ICD-10-CM

## 2024-03-21 PROCEDURE — 99214 OFFICE O/P EST MOD 30 MIN: CPT | Performed by: INTERNAL MEDICINE

## 2024-03-21 RX ORDER — LISINOPRIL 5 MG/1
TABLET ORAL
Qty: 0 | Refills: 0 | Status: ACTIVE | COMMUNITY
Start: 1900-01-01

## 2024-03-21 RX ORDER — LACTULOSE 10 G/15ML
TAKE 15 ML BY MOUTH 1-2 TIMES DAILY 30 DAY(S) SOLUTION ORAL
Qty: 1000 MILLILITER | Refills: 4 | Status: ON HOLD | COMMUNITY

## 2024-03-21 RX ORDER — LISDEXAMFETAMINE DIMESYLATE 50 MG/1
TAKE 1 CAPSULE (50 MG) BY ORAL ROUTE ONCE DAILY IN THE MORNING CAPSULE ORAL 1
Qty: 0 | Refills: 0 | Status: ON HOLD | COMMUNITY
Start: 1900-01-01

## 2024-03-21 RX ORDER — BUPROPION HYDROCHLORIDE 300 MG/1
TAKE 1 TABLET (300 MG) BY ORAL ROUTE ONCE DAILY TABLET, EXTENDED RELEASE ORAL 1
Qty: 0 | Refills: 0 | Status: ACTIVE | COMMUNITY
Start: 1900-01-01

## 2024-03-22 LAB
A/G RATIO: 1.6
AFP, SERUM, TUMOR MARKER: 2.2
ALBUMIN: 4
ALKALINE PHOSPHATASE: 103
ALT (SGPT): 15
AST (SGOT): 19
BASO (ABSOLUTE): 0.1
BASOS: 1
BILIRUBIN, TOTAL: 0.4
BUN/CREATININE RATIO: 13
BUN: 10
CALCIUM: 9.2
CARBON DIOXIDE, TOTAL: 23
CHLORIDE: 105
CREATININE: 0.75
EGFR: 85
EOS (ABSOLUTE): 0.2
EOS: 3
GLOBULIN, TOTAL: 2.5
GLUCOSE: 232
HEMATOCRIT: 39.9
HEMATOLOGY COMMENTS:: (no result)
HEMOGLOBIN: 13.1
IMMATURE CELLS: (no result)
IMMATURE GRANS (ABS): 0
IMMATURE GRANULOCYTES: 0
INR: 1.1
LYMPHS (ABSOLUTE): 1.8
LYMPHS: 23
MCH: 32.2
MCHC: 32.8
MCV: 98
MONOCYTES(ABSOLUTE): 0.4
MONOCYTES: 5
NEUTROPHILS (ABSOLUTE): 5.4
NEUTROPHILS: 68
NRBC: (no result)
PLATELETS: 158
POTASSIUM: 4.2
PROTEIN, TOTAL: 6.5
PROTHROMBIN TIME: 11.4
RBC: 4.07
RDW: 12
SODIUM: 142
WBC: 7.8

## 2024-04-11 ENCOUNTER — EGD (OUTPATIENT)
Dept: URBAN - METROPOLITAN AREA LAB 3 | Facility: LAB | Age: 74
End: 2024-04-11
Payer: MEDICARE

## 2024-04-11 DIAGNOSIS — K29.60 ADENOPAPILLOMATOSIS GASTRICA: ICD-10-CM

## 2024-04-11 DIAGNOSIS — K74.69 CIRRHOSIS, CRYPTOGENIC: ICD-10-CM

## 2024-04-11 DIAGNOSIS — K29.80 ACUTE DUODENITIS: ICD-10-CM

## 2024-04-11 PROCEDURE — 43239 EGD BIOPSY SINGLE/MULTIPLE: CPT | Performed by: INTERNAL MEDICINE

## 2024-04-11 RX ORDER — LISDEXAMFETAMINE DIMESYLATE 50 MG/1
TAKE 1 CAPSULE (50 MG) BY ORAL ROUTE ONCE DAILY IN THE MORNING CAPSULE ORAL 1
Qty: 0 | Refills: 0 | Status: ON HOLD | COMMUNITY
Start: 1900-01-01

## 2024-04-11 RX ORDER — BUPROPION HYDROCHLORIDE 300 MG/1
TAKE 1 TABLET (300 MG) BY ORAL ROUTE ONCE DAILY TABLET, EXTENDED RELEASE ORAL 1
Qty: 0 | Refills: 0 | Status: ACTIVE | COMMUNITY
Start: 1900-01-01

## 2024-04-11 RX ORDER — LACTULOSE 10 G/15ML
TAKE 15 ML BY MOUTH 1-2 TIMES DAILY 30 DAY(S) SOLUTION ORAL
Qty: 1000 MILLILITER | Refills: 4 | Status: ON HOLD | COMMUNITY

## 2024-04-11 RX ORDER — LISINOPRIL 5 MG/1
TABLET ORAL
Qty: 0 | Refills: 0 | Status: ACTIVE | COMMUNITY
Start: 1900-01-01

## 2024-10-04 ENCOUNTER — DASHBOARD ENCOUNTERS (OUTPATIENT)
Age: 74
End: 2024-10-04

## 2024-10-04 ENCOUNTER — OFFICE VISIT (OUTPATIENT)
Dept: URBAN - METROPOLITAN AREA CLINIC 98 | Facility: CLINIC | Age: 74
End: 2024-10-04

## 2024-10-04 ENCOUNTER — LAB OUTSIDE AN ENCOUNTER (OUTPATIENT)
Dept: URBAN - METROPOLITAN AREA CLINIC 98 | Facility: CLINIC | Age: 74
End: 2024-10-04

## 2024-10-04 VITALS
HEIGHT: 63 IN | WEIGHT: 176 LBS | DIASTOLIC BLOOD PRESSURE: 67 MMHG | TEMPERATURE: 97 F | SYSTOLIC BLOOD PRESSURE: 95 MMHG | HEART RATE: 104 BPM | BODY MASS INDEX: 31.18 KG/M2

## 2024-10-04 RX ORDER — LISDEXAMFETAMINE DIMESYLATE 50 MG/1
TAKE 1 CAPSULE (50 MG) BY ORAL ROUTE ONCE DAILY IN THE MORNING CAPSULE ORAL 1
Qty: 0 | Refills: 0 | Status: ON HOLD | COMMUNITY
Start: 1900-01-01

## 2024-10-04 RX ORDER — BUPROPION HYDROCHLORIDE 300 MG/1
TAKE 1 TABLET (300 MG) BY ORAL ROUTE ONCE DAILY TABLET, EXTENDED RELEASE ORAL 1
Qty: 0 | Refills: 0 | Status: ACTIVE | COMMUNITY
Start: 1900-01-01

## 2024-10-04 RX ORDER — NALOXEGOL OXALATE 12.5 MG/1
1 TABLET IN THE MORNING TABLET, FILM COATED ORAL ONCE A DAY
Status: ACTIVE | COMMUNITY
Start: 2024-10-04

## 2024-10-04 RX ORDER — LISINOPRIL 5 MG/1
TABLET ORAL
Qty: 0 | Refills: 0 | Status: ACTIVE | COMMUNITY
Start: 1900-01-01

## 2024-10-04 RX ORDER — LACTULOSE 10 G/15ML
TAKE 15 ML BY MOUTH 1-2 TIMES DAILY 30 DAY(S) SOLUTION ORAL
Qty: 1000 MILLILITER | Refills: 4 | Status: ON HOLD | COMMUNITY

## 2024-10-04 NOTE — HPI-TODAY'S VISIT:
3/2024- Dr. Paredes Here for routine follow up  her older sister passed away from MI in October, suddenly.   pt is still in grief ; admits she is having a lot of depression  going to pain management : on pregabalin - very sedating has a lot of gas   10/4/24- Kellen Nieves NP - 73 yo female here for 6 month follow-up cirrhosis - PCP Dr. Mcfadden - Now taking morphine and oxycodone for pain - MRI 4/24/24- .    Hepatic cirrhosis. No suspicious hepatic lesions.        2.    Upper abdominal varices. No ascites. - Last labs 3/2024

## 2024-10-05 LAB
AFP, SERUM, TUMOR MARKER: 1.8
ALBUMIN: 4.3
ALKALINE PHOSPHATASE: 117
ALT (SGPT): 26
AST (SGOT): 28
BASO (ABSOLUTE): 0.1
BASOS: 1
BILIRUBIN, TOTAL: 0.7
BUN/CREATININE RATIO: 12
BUN: 10
CALCIUM: 9.6
CARBON DIOXIDE, TOTAL: 24
CHLORIDE: 100
CREATININE: 0.86
EGFR: 72
EOS (ABSOLUTE): 0.5
EOS: 4
GLOBULIN, TOTAL: 2.9
GLUCOSE: 96
HEMATOCRIT: 45.3
HEMATOLOGY COMMENTS:: (no result)
HEMOGLOBIN: 14.6
IMMATURE CELLS: (no result)
IMMATURE GRANS (ABS): 0
IMMATURE GRANULOCYTES: 0
INR: 1.1
LYMPHS (ABSOLUTE): 3.5
LYMPHS: 30
MCH: 31.5
MCHC: 32.2
MCV: 98
MONOCYTES(ABSOLUTE): 0.6
MONOCYTES: 5
NEUTROPHILS (ABSOLUTE): 7.2
NEUTROPHILS: 60
NRBC: (no result)
PLATELETS: 177
POTASSIUM: 3.7
PROTEIN, TOTAL: 7.2
PROTHROMBIN TIME: 12.3
RBC: 4.64
RDW: 12.5
SODIUM: 141
WBC: 11.9

## 2024-11-06 ENCOUNTER — OFFICE VISIT (OUTPATIENT)
Dept: URBAN - METROPOLITAN AREA CLINIC 22 | Facility: CLINIC | Age: 74
End: 2024-11-06

## 2024-11-20 ENCOUNTER — WEB ENCOUNTER (OUTPATIENT)
Dept: URBAN - METROPOLITAN AREA CLINIC 98 | Facility: CLINIC | Age: 74
End: 2024-11-20

## 2024-11-20 ENCOUNTER — OFFICE VISIT (OUTPATIENT)
Dept: URBAN - METROPOLITAN AREA CLINIC 22 | Facility: CLINIC | Age: 74
End: 2024-11-20
Payer: MEDICARE

## 2024-11-20 DIAGNOSIS — K74.60 CIRRHOSIS: ICD-10-CM

## 2024-11-20 PROCEDURE — 76700 US EXAM ABDOM COMPLETE: CPT | Performed by: INTERNAL MEDICINE

## 2025-04-04 ENCOUNTER — LAB OUTSIDE AN ENCOUNTER (OUTPATIENT)
Dept: URBAN - METROPOLITAN AREA CLINIC 98 | Facility: CLINIC | Age: 75
End: 2025-04-04

## 2025-04-04 ENCOUNTER — OFFICE VISIT (OUTPATIENT)
Dept: URBAN - METROPOLITAN AREA CLINIC 98 | Facility: CLINIC | Age: 75
End: 2025-04-04
Payer: MEDICARE

## 2025-04-04 DIAGNOSIS — K75.81 NONALCOHOLIC STEATOHEPATITIS (NASH): ICD-10-CM

## 2025-04-04 DIAGNOSIS — R13.19 ESOPHAGEAL DYSPHAGIA: ICD-10-CM

## 2025-04-04 DIAGNOSIS — K74.60 CIRRHOSIS: ICD-10-CM

## 2025-04-04 DIAGNOSIS — D50.0 IRON DEFICIENCY ANEMIA DUE TO CHRONIC BLOOD LOSS: ICD-10-CM

## 2025-04-04 DIAGNOSIS — I85.00 ESOPHAGEAL VARICES: ICD-10-CM

## 2025-04-04 DIAGNOSIS — K59.03 CONSTIPATION DUE TO OPIOID THERAPY: ICD-10-CM

## 2025-04-04 PROCEDURE — 99214 OFFICE O/P EST MOD 30 MIN: CPT | Performed by: INTERNAL MEDICINE

## 2025-04-04 RX ORDER — BUPROPION HYDROCHLORIDE 300 MG/1
TAKE 1 TABLET (300 MG) BY ORAL ROUTE ONCE DAILY TABLET, EXTENDED RELEASE ORAL 1
Qty: 0 | Refills: 0 | Status: ACTIVE | COMMUNITY
Start: 1900-01-01

## 2025-04-04 RX ORDER — OMEPRAZOLE 40 MG/1
1 CAPSULE 1/2 TO 1 HOUR BEFORE MORNING MEAL CAPSULE, DELAYED RELEASE ORAL ONCE A DAY
Qty: 30 | OUTPATIENT
Start: 2025-04-04

## 2025-04-04 RX ORDER — LACTULOSE 10 G/15ML
TAKE 15 ML BY MOUTH 1-2 TIMES DAILY 30 DAY(S) SOLUTION ORAL
Qty: 1000 MILLILITER | Refills: 4 | Status: ON HOLD | COMMUNITY

## 2025-04-04 RX ORDER — LISDEXAMFETAMINE DIMESYLATE 50 MG/1
TAKE 1 CAPSULE (50 MG) BY ORAL ROUTE ONCE DAILY IN THE MORNING CAPSULE ORAL 1
Qty: 0 | Refills: 0 | Status: ON HOLD | COMMUNITY
Start: 1900-01-01

## 2025-04-04 RX ORDER — LISINOPRIL 5 MG/1
TABLET ORAL
Qty: 0 | Refills: 0 | Status: ACTIVE | COMMUNITY
Start: 1900-01-01

## 2025-04-04 RX ORDER — NALOXEGOL OXALATE 12.5 MG/1
1 TABLET IN THE MORNING TABLET, FILM COATED ORAL ONCE A DAY
Status: ACTIVE | COMMUNITY
Start: 2024-10-04

## 2025-04-04 NOTE — HPI-TODAY'S VISIT:
Two times - had trouble swallowing solid food and pills  thought a pill got stuck but it passed  has a cough  also taking Omeprazole 40mg but not every day; once a week only

## 2025-04-05 ENCOUNTER — TELEPHONE ENCOUNTER (OUTPATIENT)
Dept: URBAN - METROPOLITAN AREA CLINIC 98 | Facility: CLINIC | Age: 75
End: 2025-04-05

## 2025-04-05 LAB
AFP, SERUM, TUMOR MARKER: 1.8
ALBUMIN: 4.1
ALKALINE PHOSPHATASE: 129
ALT (SGPT): 40
AST (SGOT): 31
BASO (ABSOLUTE): 0.1
BASOS: 1
BILIRUBIN, TOTAL: 0.8
BUN/CREATININE RATIO: 15
BUN: 13
CALCIUM: 10
CARBON DIOXIDE, TOTAL: 23
CHLORIDE: 101
CREATININE: 0.87
EGFR: 71
EOS (ABSOLUTE): 0.2
EOS: 2
FERRITIN, SERUM: 122
GLOBULIN, TOTAL: 2.5
GLUCOSE: 114
HEMATOCRIT: 39.8
HEMATOLOGY COMMENTS:: (no result)
HEMOGLOBIN: 13.4
IMMATURE CELLS: (no result)
IMMATURE GRANS (ABS): 0
IMMATURE GRANULOCYTES: 0
INR: 1.1
IRON BIND.CAP.(TIBC): 271
IRON SATURATION: 31
IRON: 84
LYMPHS (ABSOLUTE): 2.1
LYMPHS: 21
MCH: 32.9
MCHC: 33.7
MCV: 98
MONOCYTES(ABSOLUTE): 0.5
MONOCYTES: 5
NEUTROPHILS (ABSOLUTE): 7.1
NEUTROPHILS: 71
NRBC: (no result)
PLATELETS: 141
POTASSIUM: 3.9
PROTEIN, TOTAL: 6.6
PROTHROMBIN TIME: 12.2
RBC: 4.07
RDW: 12.3
SODIUM: 140
UIBC: 187
WBC: 10

## 2025-04-22 ENCOUNTER — LAB OUTSIDE AN ENCOUNTER (OUTPATIENT)
Dept: URBAN - METROPOLITAN AREA CLINIC 98 | Facility: CLINIC | Age: 75
End: 2025-04-22

## 2025-04-25 ENCOUNTER — TELEPHONE ENCOUNTER (OUTPATIENT)
Dept: URBAN - METROPOLITAN AREA CLINIC 98 | Facility: CLINIC | Age: 75
End: 2025-04-25

## 2025-05-22 ENCOUNTER — TELEPHONE ENCOUNTER (OUTPATIENT)
Dept: URBAN - METROPOLITAN AREA CLINIC 98 | Facility: CLINIC | Age: 75
End: 2025-05-22

## 2025-06-12 ENCOUNTER — LAB OUTSIDE AN ENCOUNTER (OUTPATIENT)
Dept: URBAN - METROPOLITAN AREA CLINIC 98 | Facility: CLINIC | Age: 75
End: 2025-06-12

## 2025-06-12 ENCOUNTER — OFFICE VISIT (OUTPATIENT)
Dept: URBAN - METROPOLITAN AREA LAB 3 | Facility: LAB | Age: 75
End: 2025-06-12
Payer: MEDICARE

## 2025-06-12 DIAGNOSIS — I85.10 SECONDARY ESOPHAGEAL VARICES WITHOUT BLEEDING: ICD-10-CM

## 2025-06-12 DIAGNOSIS — K29.60 OTHER GASTRITIS WITHOUT BLEEDING: ICD-10-CM

## 2025-06-12 DIAGNOSIS — K74.69 OTHER CIRRHOSIS OF LIVER: ICD-10-CM

## 2025-06-12 LAB
GLUCOSE POC: 144
PERFORMING LAB: (no result)

## 2025-06-12 PROCEDURE — 43239 EGD BIOPSY SINGLE/MULTIPLE: CPT | Performed by: INTERNAL MEDICINE

## 2025-06-17 ENCOUNTER — TELEPHONE ENCOUNTER (OUTPATIENT)
Dept: URBAN - METROPOLITAN AREA CLINIC 98 | Facility: CLINIC | Age: 75
End: 2025-06-17

## 2025-07-16 ENCOUNTER — ERX REFILL RESPONSE (OUTPATIENT)
Dept: URBAN - METROPOLITAN AREA CLINIC 98 | Facility: CLINIC | Age: 75
End: 2025-07-16

## 2025-07-16 RX ORDER — OMEPRAZOLE 20 MG/1
TAKE 1 CAPSULE BY MOUTH EVERY DAY 30 MINUTES BEFORE MORNING MEAL CAPSULE, DELAYED RELEASE ORAL
Qty: 90 CAPSULE | Refills: 1 | OUTPATIENT

## 2025-08-14 ENCOUNTER — CLAIMS CREATED FROM THE CLAIM WINDOW (OUTPATIENT)
Dept: URBAN - METROPOLITAN AREA MEDICAL CENTER 27 | Facility: MEDICAL CENTER | Age: 75
End: 2025-08-14

## 2025-08-14 PROCEDURE — 99254 IP/OBS CNSLTJ NEW/EST MOD 60: CPT | Performed by: INTERNAL MEDICINE

## 2025-08-15 ENCOUNTER — CLAIMS CREATED FROM THE CLAIM WINDOW (OUTPATIENT)
Dept: URBAN - METROPOLITAN AREA MEDICAL CENTER 27 | Facility: MEDICAL CENTER | Age: 75
End: 2025-08-15

## 2025-08-15 PROCEDURE — 99232 SBSQ HOSP IP/OBS MODERATE 35: CPT | Performed by: INTERNAL MEDICINE

## 2025-08-16 ENCOUNTER — CLAIMS CREATED FROM THE CLAIM WINDOW (OUTPATIENT)
Dept: URBAN - METROPOLITAN AREA MEDICAL CENTER 27 | Facility: MEDICAL CENTER | Age: 75
End: 2025-08-16

## 2025-08-16 PROCEDURE — 99232 SBSQ HOSP IP/OBS MODERATE 35: CPT | Performed by: INTERNAL MEDICINE

## 2025-08-17 ENCOUNTER — CLAIMS CREATED FROM THE CLAIM WINDOW (OUTPATIENT)
Dept: URBAN - METROPOLITAN AREA MEDICAL CENTER 27 | Facility: MEDICAL CENTER | Age: 75
End: 2025-08-17

## 2025-08-17 PROCEDURE — 99232 SBSQ HOSP IP/OBS MODERATE 35: CPT | Performed by: INTERNAL MEDICINE

## 2025-08-18 ENCOUNTER — CLAIMS CREATED FROM THE CLAIM WINDOW (OUTPATIENT)
Dept: URBAN - METROPOLITAN AREA MEDICAL CENTER 27 | Facility: MEDICAL CENTER | Age: 75
End: 2025-08-18

## 2025-08-18 PROCEDURE — 99232 SBSQ HOSP IP/OBS MODERATE 35: CPT | Performed by: INTERNAL MEDICINE

## 2025-08-19 ENCOUNTER — CLAIMS CREATED FROM THE CLAIM WINDOW (OUTPATIENT)
Dept: URBAN - METROPOLITAN AREA MEDICAL CENTER 27 | Facility: MEDICAL CENTER | Age: 75
End: 2025-08-19

## 2025-08-19 PROCEDURE — 45380 COLONOSCOPY AND BIOPSY: CPT | Performed by: INTERNAL MEDICINE

## 2025-08-20 ENCOUNTER — CLAIMS CREATED FROM THE CLAIM WINDOW (OUTPATIENT)
Dept: URBAN - METROPOLITAN AREA MEDICAL CENTER 27 | Facility: MEDICAL CENTER | Age: 75
End: 2025-08-20

## 2025-08-20 PROCEDURE — 99232 SBSQ HOSP IP/OBS MODERATE 35: CPT | Performed by: INTERNAL MEDICINE
